# Patient Record
Sex: MALE | Race: BLACK OR AFRICAN AMERICAN | URBAN - METROPOLITAN AREA
[De-identification: names, ages, dates, MRNs, and addresses within clinical notes are randomized per-mention and may not be internally consistent; named-entity substitution may affect disease eponyms.]

---

## 2022-03-12 ENCOUNTER — HOSPITAL ENCOUNTER (EMERGENCY)
Age: 27
Discharge: HOME | End: 2022-03-12
Attending: EMERGENCY MEDICINE
Payer: MEDICAID

## 2022-03-12 VITALS
SYSTOLIC BLOOD PRESSURE: 144 MMHG | TEMPERATURE: 98 F | RESPIRATION RATE: 18 BRPM | BODY MASS INDEX: 27.2 KG/M2 | WEIGHT: 190 LBS | OXYGEN SATURATION: 100 % | DIASTOLIC BLOOD PRESSURE: 72 MMHG | HEIGHT: 70 IN | HEART RATE: 71 BPM

## 2022-03-12 DIAGNOSIS — A64 SEXUALLY TRANSMITTED DISEASE: Primary | ICD-10-CM

## 2022-03-12 DIAGNOSIS — R36.9 PENILE DISCHARGE: ICD-10-CM

## 2022-03-12 PROCEDURE — 74011250637 HC RX REV CODE- 250/637: Performed by: EMERGENCY MEDICINE

## 2022-03-12 PROCEDURE — 87591 N.GONORRHOEAE DNA AMP PROB: CPT

## 2022-03-12 PROCEDURE — 99284 EMERGENCY DEPT VISIT MOD MDM: CPT

## 2022-03-12 PROCEDURE — 74011250636 HC RX REV CODE- 250/636: Performed by: EMERGENCY MEDICINE

## 2022-03-12 PROCEDURE — 96372 THER/PROPH/DIAG INJ SC/IM: CPT

## 2022-03-12 PROCEDURE — 36415 COLL VENOUS BLD VENIPUNCTURE: CPT

## 2022-03-12 PROCEDURE — 74011000250 HC RX REV CODE- 250: Performed by: EMERGENCY MEDICINE

## 2022-03-12 PROCEDURE — 86592 SYPHILIS TEST NON-TREP QUAL: CPT

## 2022-03-12 PROCEDURE — 87389 HIV-1 AG W/HIV-1&-2 AB AG IA: CPT

## 2022-03-12 RX ORDER — AZITHROMYCIN 500 MG/1
1000 TABLET, FILM COATED ORAL
Status: COMPLETED | OUTPATIENT
Start: 2022-03-12 | End: 2022-03-12

## 2022-03-12 RX ORDER — AZITHROMYCIN 500 MG/1
1000 TABLET, FILM COATED ORAL DAILY
Status: DISCONTINUED | OUTPATIENT
Start: 2022-03-13 | End: 2022-03-12

## 2022-03-12 RX ADMIN — AZITHROMYCIN MONOHYDRATE 1000 MG: 500 TABLET ORAL at 23:23

## 2022-03-12 RX ADMIN — CEFTRIAXONE SODIUM 500 MG: 500 INJECTION, POWDER, FOR SOLUTION INTRAMUSCULAR; INTRAVENOUS at 23:11

## 2022-03-13 LAB
HIV 1+2 AB+HIV1 P24 AG SERPL QL IA: NONREACTIVE
HIV12 RESULT COMMENT, HHIVC: NORMAL
RPR SER QL: NONREACTIVE

## 2022-03-13 NOTE — ED PROVIDER NOTES
EMERGENCY DEPARTMENT HISTORY AND PHYSICAL EXAM      Date: 3/12/2022  Patient Name: King Cristina      History of Presenting Illness     Chief Complaint   Patient presents with    Penile Discharge       History Provided By: Patient    HPI: King Cristina, 32 y.o. male with a past medical history significant for denies presents to the ED with cc of cloudy urine. Patient reports few days of penile discomfort and cloudy urine after having unprotected sex with a new partner. States his partner felt uncomfortable in pelvic region but only tested positive for yeast. Denies F/C/N/V/D, dysuria, hematuria. States that his erections feel less strong during sex ever since sx began but still having nocturnal erections. There are no other complaints, changes, or physical findings at this time. PCP: No primary care provider on file. Current Facility-Administered Medications   Medication Dose Route Frequency Provider Last Rate Last Admin    cefTRIAXone (ROCEPHIN) 500 mg in lidocaine (PF) (XYLOCAINE) 10 mg/mL (1 %) IM injection  500 mg IntraMUSCular NOW Michelet Denson MD       [facility] [START ON 3/13/2022] azithromycin (ZITHROMAX) tablet 1,000 mg  1,000 mg Oral DAILY Joanne Keating MD           Past History     Past Medical History:  No past medical history on file. Past Surgical History:  No past surgical history on file. Family History:  No family history on file. Social History:  Social History     Tobacco Use    Smoking status: Not on file    Smokeless tobacco: Not on file   Substance Use Topics    Alcohol use: Not on file    Drug use: Not on file       Allergies:  No Known Allergies      Review of Systems   Constitutional: Negative except as in HPI. Eyes: Negative except as in HPI.  ENT: Negative except as in HPI. Cardiovascular: Negative except as in HPI. Respiratory: Negative except as in HPI. Gastrointestinal: Negative except as in HPI. Genitourinary: Negative except as in HPI.   Musculoskeletal: Negative except as in HPI. Integumentary: Negative except as in HPI. Neurological: Negative except as in HPI. Psychiatric: Negative except as in HPI. Endocrine: Negative except as in HPI. Hematologic/Lymphatic: Negative except as in HPI. Allergic/Immunologic: Negative except as in HPI. Physical Exam   Constitutional: Awake and alert, interactive, NAD  Eyes: PERRL, no injection or scleral icterus, no discharge  HEENT: NCAT, neck supple, MMM, no oropharyngeal exudates, TM  CV: Mentating well  Respiratory: Unlabored  GI: Abd soft, nondistended, nontender  : No penile or testicular tenderness or visualized discharge. MSK: FROM, no joint effusions or edema  Skin: No rashes  Neuro: CN2-12 intact, symmetric facies, fluent speech. Psych: Well-groomed, normal speech, behavior, appropriate mood    Lab and Diagnostic Study Results     Labs -   No results found for this or any previous visit (from the past 12 hour(s)). Radiologic Studies -   [unfilled]  CT Results  (Last 48 hours)    None        CXR Results  (Last 48 hours)    None          Medical Decision Making and ED Course   - I am the first and primary provider for this patient AND AM THE PRIMARY PROVIDER OF RECORD. - I reviewed the vital signs, available nursing notes, past medical history, past surgical history, family history and social history. - Initial assessment performed. The patients presenting problems have been discussed, and the staff are in agreement with the care plan formulated and outlined with them. I have encouraged them to ask questions as they arise throughout their visit. Vital Signs-Reviewed the patient's vital signs. Patient Vitals for the past 12 hrs:   Temp Pulse Resp BP SpO2   03/12/22 2248 98 °F (36.7 °C) 71 18 (!) 144/72 100 %       EKG interpretation:         Provider Notes (Medical Decision Making):   26M w/penile discharge after unprotected sex, will treat empirically w/CTX+Azithro and send off HIV/RPR.  UA and G/C chlamydia for f/u in case grows something but unlikely UTI given male sex and no hx of prior UTI. Erection changes likely mental, no concerning findings on exam and having erections, will give urologist for PRN f/u. Dispo home w/return precautions. ED Course:                Disposition     Disposition: DC- Adult Discharges: All of the diagnostic tests were reviewed and questions answered. Diagnosis, care plan and treatment options were discussed. The patient understands the instructions and will follow up as directed. The patients results have been reviewed with them. They have been counseled regarding their diagnosis. The patient verbally convey understanding and agreement of the signs, symptoms, diagnosis, treatment and prognosis and additionally agrees to follow up as recommended with their PCP in 24 - 48 hours. They also agree with the care-plan and convey that all of their questions have been answered. I have also put together some discharge instructions for them that include: 1) educational information regarding their diagnosis, 2) how to care for their diagnosis at home, as well a 3) list of reasons why they would want to return to the ED prior to their follow-up appointment, should their condition change. Discharged      Diagnosis     Clinical Impression:   1. Sexually transmitted disease    2. Penile discharge        Attestations:     Cheikh Esposito MD

## 2022-03-13 NOTE — DISCHARGE INSTRUCTIONS
You were seen in the ER for your penile discharge. This is likely from a sexually transmitted disease. We gave you antibiotics to treat this infection. We also sent off tests for syphillis and HIV. You will receive a call if these tests come back positive. Avoid having sex for the next week to prevent spending your STD. Use condoms to prevent this from happening in the future. Return to the ER for any severe pain, fevers, vomiting or any other new or concerning symptoms. Thank you! Thank you for allowing me to care for you in the emergency department. I sincerely hope that you are satisfied with your visit today. It is my goal to provide you with excellent care. Below you will find a list of your labs and imaging from your visit today. Should you have any questions regarding these results please do not hesitate to call the emergency department. Labs -   No results found for this or any previous visit (from the past 12 hour(s)). Radiologic Studies -   No orders to display     CT Results  (Last 48 hours)      None          CXR Results  (Last 48 hours)      None               If you feel that you have not received excellent quality care or timely care, please ask to speak to the nurse manager. Please choose us in the future for your continued health care needs. ------------------------------------------------------------------------------------------------------------  The exam and treatment you received in the Emergency Department were for an urgent problem and are not intended as complete care. It is important that you follow-up with a doctor, nurse practitioner, or physician assistant to:  (1) confirm your diagnosis,  (2) re-evaluation of changes in your illness and treatment, and  (3) for ongoing care. If your symptoms become worse or you do not improve as expected and you are unable to reach your usual health care provider, you should return to the Emergency Department.  We are available 24 hours a day. Please take your discharge instructions with you when you go to your follow-up appointment. If you have any problem arranging a follow-up appointment, contact the Emergency Department immediately. If a prescription has been provided, please have it filled as soon as possible to prevent a delay in treatment. Read the entire medication instruction sheet provided to you by the pharmacy. If you have any questions or reservations about taking the medication due to side effects or interactions with other medications, please call your primary care physician or contact the ER to speak with the charge nurse. Make an appointment with your family doctor or the physician you were referred to for follow-up of this visit as instructed on your discharge paperwork, as this is a mandatory follow-up. Return to the ER if you are unable to be seen or if you are unable to be seen in a timely manner. If you have any problem arranging the follow-up visit, contact the Emergency Department immediately.

## 2022-03-15 LAB
C TRACH RRNA SPEC QL NAA+PROBE: NEGATIVE
N GONORRHOEA RRNA SPEC QL NAA+PROBE: NEGATIVE
PLEASE NOTE:, 188601: NORMAL
SPECIMEN SOURCE: NORMAL

## 2024-03-21 ENCOUNTER — HOSPITAL ENCOUNTER (EMERGENCY)
Facility: HOSPITAL | Age: 29
Discharge: HOME | End: 2024-03-21
Payer: MEDICAID

## 2024-03-21 ENCOUNTER — APPOINTMENT (OUTPATIENT)
Facility: HOSPITAL | Age: 29
End: 2024-03-21
Payer: MEDICAID

## 2024-03-21 VITALS
DIASTOLIC BLOOD PRESSURE: 82 MMHG | WEIGHT: 170 LBS | BODY MASS INDEX: 24.34 KG/M2 | OXYGEN SATURATION: 99 % | TEMPERATURE: 98 F | HEIGHT: 70 IN | SYSTOLIC BLOOD PRESSURE: 121 MMHG | RESPIRATION RATE: 16 BRPM | HEART RATE: 67 BPM

## 2024-03-21 DIAGNOSIS — M43.16 ANTEROLISTHESIS OF LUMBAR SPINE: ICD-10-CM

## 2024-03-21 DIAGNOSIS — M54.30 SCIATICA, UNSPECIFIED LATERALITY: Primary | ICD-10-CM

## 2024-03-21 PROCEDURE — 96372 THER/PROPH/DIAG INJ SC/IM: CPT

## 2024-03-21 PROCEDURE — 99284 EMERGENCY DEPT VISIT MOD MDM: CPT

## 2024-03-21 PROCEDURE — 72131 CT LUMBAR SPINE W/O DYE: CPT

## 2024-03-21 PROCEDURE — 6360000002 HC RX W HCPCS

## 2024-03-21 PROCEDURE — 6370000000 HC RX 637 (ALT 250 FOR IP)

## 2024-03-21 RX ORDER — LIDOCAINE 4 G/G
1 PATCH TOPICAL DAILY
Qty: 30 PATCH | Refills: 0 | Status: SHIPPED | OUTPATIENT
Start: 2024-03-21 | End: 2024-04-20

## 2024-03-21 RX ORDER — PREDNISONE 20 MG/1
60 TABLET ORAL
Status: COMPLETED | OUTPATIENT
Start: 2024-03-21 | End: 2024-03-21

## 2024-03-21 RX ORDER — LIDOCAINE 4 G/G
1 PATCH TOPICAL
Status: DISCONTINUED | OUTPATIENT
Start: 2024-03-21 | End: 2024-03-21 | Stop reason: HOSPADM

## 2024-03-21 RX ORDER — METHOCARBAMOL 750 MG/1
750 TABLET, FILM COATED ORAL 4 TIMES DAILY
Qty: 40 TABLET | Refills: 0 | Status: SHIPPED | OUTPATIENT
Start: 2024-03-21 | End: 2024-03-31

## 2024-03-21 RX ORDER — NAPROXEN 500 MG/1
500 TABLET ORAL 2 TIMES DAILY
Qty: 60 TABLET | Refills: 0 | Status: SHIPPED | OUTPATIENT
Start: 2024-03-21

## 2024-03-21 RX ORDER — KETOROLAC TROMETHAMINE 30 MG/ML
30 INJECTION, SOLUTION INTRAMUSCULAR; INTRAVENOUS
Status: COMPLETED | OUTPATIENT
Start: 2024-03-21 | End: 2024-03-21

## 2024-03-21 RX ORDER — PREDNISONE 50 MG/1
50 TABLET ORAL DAILY
Qty: 4 TABLET | Refills: 0 | Status: SHIPPED | OUTPATIENT
Start: 2024-03-21 | End: 2024-03-25

## 2024-03-21 RX ADMIN — KETOROLAC TROMETHAMINE 30 MG: 30 INJECTION, SOLUTION INTRAMUSCULAR at 12:16

## 2024-03-21 RX ADMIN — PREDNISONE 60 MG: 20 TABLET ORAL at 12:16

## 2024-03-21 ASSESSMENT — LIFESTYLE VARIABLES
HOW OFTEN DO YOU HAVE A DRINK CONTAINING ALCOHOL: NEVER
HOW MANY STANDARD DRINKS CONTAINING ALCOHOL DO YOU HAVE ON A TYPICAL DAY: PATIENT DOES NOT DRINK

## 2024-03-21 ASSESSMENT — PAIN SCALES - GENERAL
PAINLEVEL_OUTOF10: 6
PAINLEVEL_OUTOF10: 8
PAINLEVEL_OUTOF10: 6
PAINLEVEL_OUTOF10: 2

## 2024-03-21 ASSESSMENT — PAIN - FUNCTIONAL ASSESSMENT
PAIN_FUNCTIONAL_ASSESSMENT: 0-10
PAIN_FUNCTIONAL_ASSESSMENT: 0-10

## 2024-03-21 ASSESSMENT — PAIN DESCRIPTION - ORIENTATION: ORIENTATION: RIGHT

## 2024-03-21 ASSESSMENT — PAIN DESCRIPTION - LOCATION: LOCATION: LEG

## 2024-03-21 NOTE — ED TRIAGE NOTES
Pt complains of right leg pain since he pulled a muscle at work in early January. Pt says the pain extends from his right thigh to his right foot.

## 2024-03-21 NOTE — DISCHARGE INSTRUCTIONS
Thank you!  Thank you for allowing me to care for you in the emergency department. It is my goal to provide you with excellent care.  Please fill out the survey that will come to you by mail or email since we listen to your feedback!     Below you will find a list of your tests from today's visit.  Should you have any questions, please do not hesitate to call the emergency department.    Labs  No results found for this or any previous visit (from the past 12 hour(s)).    Radiologic Studies  CT LUMBAR SPINE WO CONTRAST   Final Result   Spondylosis in the lower lumbar spine as above. This would be better evaluated   with MRI.        ------------------------------------------------------------------------------------------------------------  The exam and treatment you received in the Emergency Department were for an urgent problem and are not intended as complete care. It is important that you follow-up with a doctor, nurse practitioner, or physician assistant to:  (1) confirm your diagnosis,  (2) re-evaluation of changes in your illness and treatment, and (3) for ongoing care. Please take your discharge instructions with you when you go to your follow-up appointment.     If you have any problem arranging a follow-up appointment, contact the Emergency Department.  If your symptoms become worse or you do not improve as expected and you are unable to reach your health care provider, please return to the Emergency Department. We are available 24 hours a day.     If a prescription has been provided, please have it filled as soon as possible to prevent a delay in treatment. If you have any questions or reservations about taking the medication due to side effects or interactions with other medications, please call your primary care provider or contact the ER.

## 2024-03-21 NOTE — ED PROVIDER NOTES
time. Mental status is at baseline.   Psychiatric:         Mood and Affect: Mood normal.         Behavior: Behavior normal.           SCREENINGS                  LAB, EKG AND DIAGNOSTIC RESULTS   Labs:  No results found for this or any previous visit (from the past 12 hour(s)).    EKG: Not Applicable    Radiologic Studies:  Non-plain film images such as CT, Ultrasound and MRI are read by the radiologist. Plain radiographic images are visualized and preliminarily interpreted by the ED Physician with the following findings: See ED Course Below    Interpretation per the Radiologist below, if available at the time of this note:  CT LUMBAR SPINE WO CONTRAST   Final Result   Spondylosis in the lower lumbar spine as above. This would be better evaluated   with MRI.           ED COURSE and DIFFERENTIAL DIAGNOSIS/MDM   6:12 PM Differential and Considerations:     The patient complains of low back pain radiating down leg with positive straight leg sign. These symptoms are consistent with sciatica. Less likely  pathology, aortic dissection or AAA, or cauda equina given that there are no red flags and a benign physical exam.   Considered imaging such as XR but not indicated at this time.  MRI Spine considered but not indicated based on no red flag symptoms as above.    I have recommended rest, avoiding heavy lifting until better, use of intermittent heat (avoid sleeping on a heating pad), and use of OTC NSAID's (Advil, Aleve etc) or Tylenol prn for pain and steroids PRN. Given list of sciatica Exercises. Call PCP if back pain persists or he develops leg symptoms.  It has also been explained that this may take up to three months to fully resolved and that smoking may slow that process even more.     Records Reviewed (source and summary of external notes): Prior medical records and Nursing notes    Vitals:    Vitals:    03/21/24 1124 03/21/24 1125 03/21/24 1358   BP: (!) 144/95  121/82   Pulse: 79  67   Resp: 18 18 16   Temp: